# Patient Record
Sex: MALE | Race: WHITE | Employment: FULL TIME | RURAL
[De-identification: names, ages, dates, MRNs, and addresses within clinical notes are randomized per-mention and may not be internally consistent; named-entity substitution may affect disease eponyms.]

---

## 2024-04-22 ENCOUNTER — HOSPITAL ENCOUNTER (EMERGENCY)
Facility: HOSPITAL | Age: 69
Discharge: HOME | End: 2024-04-22
Attending: EMERGENCY MEDICINE
Payer: COMMERCIAL

## 2024-04-22 ENCOUNTER — APPOINTMENT (OUTPATIENT)
Facility: HOSPITAL | Age: 69
End: 2024-04-22
Payer: COMMERCIAL

## 2024-04-22 VITALS
BODY MASS INDEX: 33.64 KG/M2 | DIASTOLIC BLOOD PRESSURE: 88 MMHG | WEIGHT: 235 LBS | HEART RATE: 66 BPM | TEMPERATURE: 98 F | OXYGEN SATURATION: 98 % | HEIGHT: 70 IN | RESPIRATION RATE: 20 BRPM | SYSTOLIC BLOOD PRESSURE: 161 MMHG

## 2024-04-22 DIAGNOSIS — M79.604 RIGHT LEG PAIN: Primary | ICD-10-CM

## 2024-04-22 LAB — ECHO BSA: 2.29 M2

## 2024-04-22 PROCEDURE — 93971 EXTREMITY STUDY: CPT

## 2024-04-22 PROCEDURE — 99284 EMERGENCY DEPT VISIT MOD MDM: CPT

## 2024-04-22 ASSESSMENT — PAIN DESCRIPTION - ORIENTATION: ORIENTATION: RIGHT

## 2024-04-22 ASSESSMENT — PAIN SCALES - GENERAL: PAINLEVEL_OUTOF10: 8

## 2024-04-22 ASSESSMENT — PAIN DESCRIPTION - LOCATION: LOCATION: LEG

## 2024-04-22 ASSESSMENT — LIFESTYLE VARIABLES: HOW OFTEN DO YOU HAVE A DRINK CONTAINING ALCOHOL: MONTHLY OR LESS

## 2024-04-22 ASSESSMENT — PAIN - FUNCTIONAL ASSESSMENT: PAIN_FUNCTIONAL_ASSESSMENT: 0-10

## 2024-04-22 NOTE — ED TRIAGE NOTES
C/o right leg pain in the hip and lateral calf area. Pt reports pain started 1900 yesterday after digging fence posts x 2 days. Pt reports pain is 8/10. No swelling noted. Pt ambulated to fast track with steady gait

## 2024-04-23 NOTE — ED PROVIDER NOTES
EMERGENCY DEPARTMENT HISTORY AND PHYSICAL EXAM      Date: 4/22/2024  Patient Name: Nghia Wood    History of Presenting Illness     Chief Complaint   Patient presents with    Leg Pain     Pt complaints of pain in his right calf area and hip s/p working on fence posts       History Provided By: Patient    HPI: Nghia Wood, 68 y.o. male as noted below presents the emergency department chief complaint of right leg pain.  Patient states that yesterday he noted some pain in his right calf as well as his right hip.  Patient reports that he noticed this the day after doing some significant physical work outside working on a fence post.  Patient states the pain is worse with movement and weightbearing.  Denies any direct trauma, leg swelling, recent long immobilization, fevers or systemic symptoms.    PCP: No primary care provider on file.    No current facility-administered medications for this encounter.     No current outpatient medications on file.       Past History     Past Medical History:  History reviewed. No pertinent past medical history.    Past Surgical History:  History reviewed. No pertinent surgical history.    Family History:  History reviewed. No pertinent family history.    Social History:       Allergies:  No Known Allergies      Review of Systems   Review of Systems      Physical Exam   Physical Exam    GENERAL: alert and oriented, no acute distress  Musculoskeletal: There is no swelling, erythema or tenderness of the right leg.  Specifically no calf tenderness.  There is no erythema or warmth.  He has strong DP pulses and brisk capillary refill in the digits.    Diagnostic Study Results     Labs -     Recent Results (from the past 12 hour(s))   Vascular duplex lower extremity venous right    Collection Time: 04/22/24 12:00 PM   Result Value Ref Range    Body Surface Area 2.29 m2       Radiologic Studies -   Non-plain film images such as CT, Ultrasound and MRI are read by the radiologist. Plain